# Patient Record
Sex: MALE | Race: WHITE | NOT HISPANIC OR LATINO | Employment: FULL TIME | URBAN - NONMETROPOLITAN AREA
[De-identification: names, ages, dates, MRNs, and addresses within clinical notes are randomized per-mention and may not be internally consistent; named-entity substitution may affect disease eponyms.]

---

## 2021-07-08 ENCOUNTER — TRANSCRIBE ORDERS (OUTPATIENT)
Dept: PODIATRY | Facility: CLINIC | Age: 51
End: 2021-07-08

## 2021-07-08 DIAGNOSIS — R22.41 NODULE OF SKIN OF RIGHT FOOT: ICD-10-CM

## 2021-07-08 DIAGNOSIS — M79.89 SWELLING OF RIGHT FOOT: Primary | ICD-10-CM

## 2021-08-17 ENCOUNTER — OFFICE VISIT (OUTPATIENT)
Dept: PODIATRY | Facility: CLINIC | Age: 51
End: 2021-08-17

## 2021-08-17 VITALS
SYSTOLIC BLOOD PRESSURE: 132 MMHG | DIASTOLIC BLOOD PRESSURE: 84 MMHG | WEIGHT: 236 LBS | BODY MASS INDEX: 34.96 KG/M2 | HEIGHT: 69 IN

## 2021-08-17 DIAGNOSIS — M77.41 METATARSALGIA OF RIGHT FOOT: ICD-10-CM

## 2021-08-17 DIAGNOSIS — M79.671 RIGHT FOOT PAIN: Primary | ICD-10-CM

## 2021-08-17 PROCEDURE — 99203 OFFICE O/P NEW LOW 30 MIN: CPT | Performed by: PODIATRIST

## 2021-08-17 RX ORDER — METHYLPREDNISOLONE 4 MG/1
TABLET ORAL
Qty: 21 TABLET | Refills: 0 | Status: SHIPPED | OUTPATIENT
Start: 2021-08-17

## 2021-08-17 RX ORDER — VALSARTAN 160 MG/1
TABLET ORAL
COMMUNITY
Start: 2021-06-04

## 2021-08-17 RX ORDER — RIVAROXABAN 15 MG/1
TABLET, FILM COATED ORAL
COMMUNITY
Start: 2021-08-14

## 2021-08-17 RX ORDER — AMLODIPINE BESYLATE 10 MG/1
TABLET ORAL
COMMUNITY
Start: 2021-08-10

## 2021-08-17 RX ORDER — HYDROCHLOROTHIAZIDE 25 MG/1
TABLET ORAL
COMMUNITY
Start: 2021-07-19

## 2021-08-17 NOTE — PROGRESS NOTES
Mitchell Rosado  1970  50 y.o. male    Right foot pain.     08/17/2021    Chief Complaint   Patient presents with   • Right Foot - Pain       History of Present Illness    Mitchell Rosado is a 50 y.o.male who presents to clinic with chief complaint of right foot pain.  Pain started approximately 3 months ago.  There are no associated injuries.  Is localized to the ball of the foot.  He rates it as a 4 out of 10.    Past Medical History:   Diagnosis Date   • Deep vein thrombosis (CMS/HCC)    • Hypertension          Past Surgical History:   Procedure Laterality Date   • HERNIA REPAIR           Family History   Problem Relation Age of Onset   • Cancer Mother    • Hypertension Mother    • Hypertension Father        No Known Allergies    Social History     Socioeconomic History   • Marital status: Single     Spouse name: Not on file   • Number of children: Not on file   • Years of education: Not on file   • Highest education level: Not on file   Tobacco Use   • Smoking status: Current Every Day Smoker     Types: Cigarettes   • Smokeless tobacco: Never Used   Vaping Use   • Vaping Use: Never used   Substance and Sexual Activity   • Alcohol use: Yes     Alcohol/week: 6.0 standard drinks     Types: 6 Cans of beer per week   • Drug use: Defer   • Sexual activity: Defer         Current Outpatient Medications   Medication Sig Dispense Refill   • amLODIPine (NORVASC) 10 MG tablet      • hydroCHLOROthiazide (HYDRODIURIL) 25 MG tablet TAKE 1/2 TO 1 TABLET BY MOUTH DAILY IN THE MORNING     • valsartan (DIOVAN) 160 MG tablet      • Xarelto 15 MG tablet TAKE 1 TABLET ORALLY 2 TIMES PER DAY MUST ADMINISTER WITH A MEAL/FOOD FOR 21 DAYS.     • methylPREDNISolone (MEDROL) 4 MG dose pack Take as directed 21 tablet 0     No current facility-administered medications for this visit.       Review of Systems   Constitutional: Negative.    HENT: Negative.    Eyes: Negative.    Respiratory: Negative.    Cardiovascular: Positive for leg  "swelling.   Gastrointestinal: Negative.    Endocrine: Negative.    Genitourinary: Negative.    Musculoskeletal:        Foot pain   Skin: Negative.    Allergic/Immunologic: Negative.  Negative for environmental allergies.   Neurological: Negative.    Hematological: Negative.    Psychiatric/Behavioral: Negative.          OBJECTIVE    /84   Ht 175.3 cm (69\")   Wt 107 kg (236 lb)   BMI 34.85 kg/m²     Physical Exam  Vitals reviewed.   Constitutional:       General: He is not in acute distress.     Appearance: He is well-developed.   HENT:      Head: Normocephalic and atraumatic.      Nose: Nose normal.   Eyes:      Conjunctiva/sclera: Conjunctivae normal.      Pupils: Pupils are equal, round, and reactive to light.   Pulmonary:      Effort: Pulmonary effort is normal. No respiratory distress.      Breath sounds: No wheezing.   Musculoskeletal:         General: Swelling and tenderness present. No deformity. Normal range of motion.   Skin:     General: Skin is warm and dry.      Capillary Refill: Capillary refill takes less than 2 seconds.   Neurological:      Mental Status: He is alert and oriented to person, place, and time.   Psychiatric:         Behavior: Behavior normal.         Thought Content: Thought content normal.          Lower Extremity Exam:     Cardiovascular:    DP/PT pulses palpable b/l    CFT brisk  to all digits b/l  Musculoskeletal:  Muscle strength is 5/5 for all muscle groups tested b/l  ROM of the 1st MTP is WNL b/l  ROM of the ankle joint is  WNL b/l  Pain on palpation to plantar lesser metatarsal heads on the right.  Dermatological:   Webspaces 1-4 b/l are clean, dry and intact.   No subcutaneous nodules or masses noted  b/l  No open wounds noted b/l  Neurological:   Protective sensation intact b/l  Sensation intact to light touch b/l       Foot/Ankle Exam        Procedures        ASSESSMENT AND PLAN    Diagnoses and all orders for this visit:    1. Right foot pain (Primary)  -     XR Foot " 3+ View Right    2. Metatarsalgia of right foot    Other orders  -     methylPREDNISolone (MEDROL) 4 MG dose pack; Take as directed  Dispense: 21 tablet; Refill: 0        - Comprehensive foot and ankle exam performed.   -Radiographs taken and reviewed.  No acute osseous or articular pathology.  -Diagnosis, etiology and treatment of metatarsalgia discussed in detail.  Recommended OTC arch support with built-in metatarsal pads.  Recommendation regarding appropriate shoe gear provided.  -Rx Medrol Dosepak  - All questions were answered to the patients satisfaction.  - RTC 4 weeks if symptoms worsen or fail to resolve            This document has been electronically signed by Ayan Blue DPM on August 18, 2021 20:59 CDT     8/18/2021  20:59 CDT